# Patient Record
Sex: FEMALE | Race: BLACK OR AFRICAN AMERICAN
[De-identification: names, ages, dates, MRNs, and addresses within clinical notes are randomized per-mention and may not be internally consistent; named-entity substitution may affect disease eponyms.]

---

## 2021-12-31 ENCOUNTER — HOSPITAL ENCOUNTER (EMERGENCY)
Dept: HOSPITAL 63 - ER | Age: 21
Discharge: HOME | End: 2021-12-31
Payer: OTHER GOVERNMENT

## 2021-12-31 VITALS — SYSTOLIC BLOOD PRESSURE: 104 MMHG | DIASTOLIC BLOOD PRESSURE: 55 MMHG

## 2021-12-31 VITALS — HEIGHT: 65 IN | WEIGHT: 176.37 LBS | BODY MASS INDEX: 29.38 KG/M2

## 2021-12-31 DIAGNOSIS — Z20.822: ICD-10-CM

## 2021-12-31 DIAGNOSIS — K52.9: Primary | ICD-10-CM

## 2021-12-31 LAB
ALBUMIN SERPL-MCNC: 3.9 G/DL (ref 3.4–5)
ALBUMIN/GLOB SERPL: 1.2 {RATIO} (ref 1–1.7)
ALP SERPL-CCNC: 39 U/L (ref 46–116)
ALT SERPL-CCNC: 19 U/L (ref 14–59)
AMORPH SED URNS QL MICRO: PRESENT /HPF
ANION GAP SERPL CALC-SCNC: 7 MMOL/L (ref 6–14)
APTT PPP: YELLOW S
AST SERPL-CCNC: 19 U/L (ref 15–37)
BACTERIA #/AREA URNS HPF: (no result) /HPF
BASOPHILS # BLD AUTO: 0 X10^3/UL (ref 0–0.2)
BASOPHILS NFR BLD: 0 % (ref 0–3)
BILIRUB SERPL-MCNC: 0.5 MG/DL (ref 0.2–1)
BILIRUB UR QL STRIP: (no result)
BUN/CREAT SERPL: 16 (ref 6–20)
CA-I SERPL ISE-MCNC: 16 MG/DL (ref 7–20)
CALCIUM SERPL-MCNC: 8.5 MG/DL (ref 8.5–10.1)
CHLORIDE SERPL-SCNC: 104 MMOL/L (ref 98–107)
CO2 SERPL-SCNC: 26 MMOL/L (ref 21–32)
CREAT SERPL-MCNC: 1 MG/DL (ref 0.6–1)
EOSINOPHIL NFR BLD: 0 % (ref 0–3)
EOSINOPHIL NFR BLD: 0 X10^3/UL (ref 0–0.7)
ERYTHROCYTE [DISTWIDTH] IN BLOOD BY AUTOMATED COUNT: 13.1 % (ref 11.5–14.5)
FIBRINOGEN PPP-MCNC: CLEAR MG/DL
GFR SERPLBLD BASED ON 1.73 SQ M-ARVRAT: 83.1 ML/MIN
GLOBULIN SER-MCNC: 3.2 G/DL (ref 2.2–3.8)
GLUCOSE SERPL-MCNC: 95 MG/DL (ref 70–99)
GLUCOSE UR STRIP-MCNC: (no result) MG/DL
HCT VFR BLD CALC: 38.9 % (ref 36–47)
HGB BLD-MCNC: 13 G/DL (ref 12–15.5)
INFLUENZA A PATIENT: NEGATIVE
INFLUENZA B PATIENT: NEGATIVE
LYMPHOCYTES # BLD: 0.6 X10^3/UL (ref 1–4.8)
LYMPHOCYTES NFR BLD AUTO: 7 % (ref 24–48)
MCH RBC QN AUTO: 30 PG (ref 25–35)
MCHC RBC AUTO-ENTMCNC: 34 G/DL (ref 31–37)
MCV RBC AUTO: 90 FL (ref 79–100)
MONO #: 0.6 X10^3/UL (ref 0–1.1)
MONOCYTES NFR BLD: 7 % (ref 0–9)
NEUT #: 6.8 X10^3UL (ref 1.8–7.7)
NEUTROPHILS NFR BLD AUTO: 85 % (ref 31–73)
NITRITE UR QL STRIP: (no result)
PLATELET # BLD AUTO: 177 X10^3/UL (ref 140–400)
POTASSIUM SERPL-SCNC: 4.5 MMOL/L (ref 3.5–5.1)
PROT SERPL-MCNC: 7.1 G/DL (ref 6.4–8.2)
RBC # BLD AUTO: 4.32 X10^6/UL (ref 3.5–5.4)
RBC #/AREA URNS HPF: 0 /HPF (ref 0–2)
SODIUM SERPL-SCNC: 137 MMOL/L (ref 136–145)
SP GR UR STRIP: >=1.03
SQUAMOUS #/AREA URNS LPF: (no result) /LPF
UROBILINOGEN UR-MCNC: 0.2 MG/DL
WBC # BLD AUTO: 8 X10^3/UL (ref 4–11)
WBC #/AREA URNS HPF: (no result) /HPF (ref 0–4)

## 2021-12-31 PROCEDURE — C9803 HOPD COVID-19 SPEC COLLECT: HCPCS

## 2021-12-31 PROCEDURE — 85025 COMPLETE CBC W/AUTO DIFF WBC: CPT

## 2021-12-31 PROCEDURE — 81001 URINALYSIS AUTO W/SCOPE: CPT

## 2021-12-31 PROCEDURE — 87804 INFLUENZA ASSAY W/OPTIC: CPT

## 2021-12-31 PROCEDURE — 99285 EMERGENCY DEPT VISIT HI MDM: CPT

## 2021-12-31 PROCEDURE — U0003 INFECTIOUS AGENT DETECTION BY NUCLEIC ACID (DNA OR RNA); SEVERE ACUTE RESPIRATORY SYNDROME CORONAVIRUS 2 (SARS-COV-2) (CORONAVIRUS DISEASE [COVID-19]), AMPLIFIED PROBE TECHNIQUE, MAKING USE OF HIGH THROUGHPUT TECHNOLOGIES AS DESCRIBED BY CMS-2020-01-R: HCPCS

## 2021-12-31 PROCEDURE — 81025 URINE PREGNANCY TEST: CPT

## 2021-12-31 PROCEDURE — 96375 TX/PRO/DX INJ NEW DRUG ADDON: CPT

## 2021-12-31 PROCEDURE — 74177 CT ABD & PELVIS W/CONTRAST: CPT

## 2021-12-31 PROCEDURE — 96361 HYDRATE IV INFUSION ADD-ON: CPT

## 2021-12-31 PROCEDURE — 80053 COMPREHEN METABOLIC PANEL: CPT

## 2021-12-31 PROCEDURE — 36415 COLL VENOUS BLD VENIPUNCTURE: CPT

## 2021-12-31 PROCEDURE — 96374 THER/PROPH/DIAG INJ IV PUSH: CPT

## 2021-12-31 RX ADMIN — ACETAMINOPHEN ONE MG: 500 TABLET ORAL at 11:45

## 2021-12-31 RX ADMIN — ACETAMINOPHEN ONE MG: 500 TABLET ORAL at 11:38

## 2021-12-31 NOTE — RAD
CT abdomen and pelvis with contrast:



Reason for examination: Abdominal pain and fever.



Helical images were obtained through the abdomen and pelvis with intravenous administration of 75 cc 
Omnipaque 300. Reconstruction was performed in sagittal and coronal planes.



Exposure: One or more of the following individualized dose reduction techniques were utilized for thi
s examination:  1. Automated exposure control  2. Adjustment of the mA and/or kV according to patient
 size  3. Use of iterative reconstruction technique.



The lung bases are clear. The heart size is normal with no pericardial effusion evident.



No abnormality seen at the liver, spleen, adrenal glands, gallbladder or pancreas.



The abdominal aorta and inferior vena cava show no acute abnormalities.



The colon shows no diverticulosis, diverticulitis or colitis. The appendix is not identified however 
there are no secondary signs of appendicitis seen. The small intestinal tract shows some mild dilatat
ion and mild wall thickening proximally which may reflect enteritis. There is no evidence of bowel ob
struction. No abnormality seen at the stomach or duodenum.



The kidneys show no renal masses, renal calculi, hydronephrosis or evidence of obstructive uropathy.



No abnormality seen at the bladder.



No abnormalities are seen at the uterus or ovaries. There is a small amount of fluid in the pelvic cu
l-de-sac which may be physiologic.



No acute bony abnormalities are seen.



IMPRESSION:



Mild wall thickening and dilatation in the proximal small intestine which may reflect some enteritis.




Small amount of fluid in the pelvic cul-de-sac which may be physiologic.



Electronically signed by: Cherie Humphrey MD (12/31/2021 11:08 AM) [facility]

## 2023-02-13 NOTE — PHYS DOC
Past History


Past Surgical History:  Other


Additional Past Surgical Histo:  BREAST ABSCESS


Alcohol Use:  None





General Adult


EDM:


Chief Complaint:  NAUSEA/VOMITING/DIARRHEA





HPI:


HPI:


21-year-old female presents with nausea, vomiting, diarrhea.  This started in 

the middle of the night.  Patient also has a fever.  She was feeling fine 

yesterday.  Patient took a gram of Tylenol at 5 AM.  She is vaccinated against 

COVID-19.  Patient still has a fever on arrival.





Review of Systems:


Review of Systems:


Constitutional:  Denies fever or chills 


Eyes:  Denies change in visual acuity 


HENT:  Denies nasal congestion or sore throat 


Respiratory:  Denies cough or shortness of breath 


Cardiovascular:  Denies chest pain or edema 


GI:  Denies abdominal pain. Nausea, vomiting, diarrhea 


: Denies dysuria 


Musculoskeletal:  Denies back pain or joint pain 


Integument:  Denies rash 


Neurologic:  Denies headache, focal weakness or sensory changes 


Endocrine:  Denies polyuria or polydipsia 


Lymphatic:  Denies swollen glands 


Psychiatric:  Denies depression or anxiety





Current Medications:


Current Meds:





Current Medications








 Medications


  (Trade)  Dose


 Ordered  Sig/Prachi  Start Time


 Stop Time Status Last Admin


Dose Admin


 


 Acetaminophen


  (Tylenol)  1,000 mg  1X  ONCE  12/31/21 07:00


 12/31/21 07:08 DC  





 


 Ondansetron HCl


  (Zofran)  4 mg  1X  ONCE  12/31/21 07:00


 12/31/21 07:08 DC 12/31/21 07:14


4 MG


 


 Sodium Chloride  1,000 ml @ 


 1,000 mls/hr  1X  ONCE  12/31/21 07:00


 12/31/21 07:59  12/31/21 07:14


1,000 MLS/HR











Allergies:


Allergies:





Allergies








Coded Allergies Type Severity Reaction Last Updated Verified


 


  No Known Drug Allergies    12/31/21 No











Physical Exam:


PE:





Constitutional: Well developed, well nourished, no acute distress, non-toxic 

appearance. []


HENT: Normocephalic, atraumatic, bilateral external ears normal, oropharynx 

moist, no oral exudates, nose normal. []


Eyes: PERRLA, EOMI, conjunctiva normal, no discharge. [] 


Neck: Normal range of motion, no tenderness, supple, no stridor. [] 


Cardiovascular: Heart rate regular rhythm, no murmur []


Lungs & Thorax:  Bilateral breath sounds clear to auscultation []


Abdomen: Bowel sounds normal, soft, no tenderness, no masses, no pulsatile 

masses. [] 


Skin: Warm, dry, no erythema, no rash. [] 


Back: No tenderness, no CVA tenderness. [] 


Extremities: No tenderness, no cyanosis, no clubbing, ROM intact, no edema. [] 


Neurologic: Alert and oriented X 3, normal motor function, normal sensory 

function, no focal deficits noted. []


Psychologic: Affect normal, judgement normal, mood normal. []





Current Patient Data:


Labs:





                                Laboratory Tests








Test


 12/31/21


07:01


 


POC Urine HCG, Qualitative


 hcg negative


(Negative)








Vital Signs:





                                   Vital Signs








  Date Time  Temp Pulse Resp B/P (MAP) Pulse Ox O2 Delivery O2 Flow Rate FiO2


 


12/31/21 07:00 101.2 120 18 122/72 (89) 99   











EKG:


EKG:


[]





Radiology/Procedures:


Radiology/Procedures:


[]


Impressions:


CT abdomen and pelvis with contrast:





Reason for examination: Abdominal pain and fever.





Helical images were obtained through the abdomen and pelvis with intravenous 

administration of 75 cc Omnipaque 300. Reconstruction was performed in sagittal 

and coronal planes.





Exposure: One or more of the following individualized dose reduction techniques 

were utilized for this examination:  1. Automated exposure control  2. 

Adjustment of the mA and/or kV according to patient size  3. Use of iterative 

reconstruction technique.





The lung bases are clear. The heart size is normal with no pericardial effusion 

evident.





No abnormality seen at the liver, spleen, adrenal glands, gallbladder or 

pancreas.





The abdominal aorta and inferior vena cava show no acute abnormalities.





The colon shows no diverticulosis, diverticulitis or colitis. The appendix is 

not identified however there are no secondary signs of appendicitis seen. The s

mall intestinal tract shows some mild dilatation and mild wall thickening 

proximally which may reflect enteritis. There is no evidence of bowel 

obstruction. No abnormality seen at the stomach or duodenum.





The kidneys show no renal masses, renal calculi, hydronephrosis or evidence of 

obstructive uropathy.





No abnormality seen at the bladder.





No abnormalities are seen at the uterus or ovaries. There is a small amount of 

fluid in the pelvic cul-de-sac which may be physiologic.





No acute bony abnormalities are seen.





IMPRESSION:





Mild wall thickening and dilatation in the proximal small intestine which may 

reflect some enteritis.





Small amount of fluid in the pelvic cul-de-sac which may be physiologic.





Electronically signed by: Cherie Rob MD (12/31/2021 11:08 AM) [facility]














DICTATED AND SIGNED BY:     CHERIE ROB MD


DATE:     12/31/21 1041





CC: ERWIN HERRING DO; PCP,UNKNOWN ~MTH0 0





Heart Score:


C/O Chest Pain:  N/A


Risk Factors:


Risk Factors:  DM, Current or recent (<one month) smoker, HTN, HLP, family 

history of CAD, obesity.


Risk Scores:


Score 0 - 3:  2.5% MACE over next 6 weeks - Discharge Home


Score 4 - 6:  20.3% MACE over next 6 weeks - Admit for Clinical Observation


Score 7 - 10:  72.7% MACE over next 6 weeks - Early Invasive Strategies





Course & Med Decision Making:


Course & Med Decision Making


Pertinent Labs and Imaging studies reviewed. (See chart for details)


The patient's labs are unremarkable.  Her influenza was negative.  She continues

 to have abdominal pains have ordered a CT of the abdomen and pelvis.  CT shows 

enteritis.  Has a fever so I will treat her as bacterial enteritis with 

Augmentin for 7 days.  She is stable for discharge at this time.


[]





Dragon Disclaimer:


Dragon Disclaimer:


This electronic medical record was generated, in whole or in part, using a voice

 recognition dictation system.





Departure


Departure:


Impression:  


   Primary Impression:  


   Enteritis


Disposition:  01 HOME / SELF CARE / HOMELESS


Condition:  STABLE


Referrals:  


PCP,UNKNOWN (PCP)


Patient Instructions:  Colitis


Scripts


Ondansetron (ONDANSETRON ODT) 4 Mg Tab.rapdis


1 TAB PO PRN Q6-8HRS PRN for VOMITING, #16 TAB


   Prov: ERWIN HERRING DO         12/31/21 


Amoxicillin/Potassium Clav (AUGMENTIN 875-125 TABLET) 1 Each Tablet


1 TAB PO BID for enteritis for 7 Days, #14 TAB 0 Refills


   Prov: ERWIN HERRING DO         12/31/21











ERWIN HERRING DO                 Dec 31, 2021 07:29 Viral illness:  No antibiotics are required  Tylenol 275mg or Motrin 180mg every 6 hours as needed for pain or fevers  Nose blowing or nasal suctioning as needed, especially before meals and bedtime  Cough medicine is usually not recommended, but you can try Zarbees or Ambrosio's natural cough syrup  Lemon and honey alone or with hot water for sore throat and cough if over 1 year of age  Lozenges or hard candies to soothe throat  Try sleeping more upright to help with cough  Humidifier in bedroom  Drink lots of fluids  Most symptoms will resolve within 1 week, but the cough can last up to 4 weeks (this is normal)